# Patient Record
Sex: FEMALE | Race: NATIVE HAWAIIAN OR OTHER PACIFIC ISLANDER | HISPANIC OR LATINO | URBAN - METROPOLITAN AREA
[De-identification: names, ages, dates, MRNs, and addresses within clinical notes are randomized per-mention and may not be internally consistent; named-entity substitution may affect disease eponyms.]

---

## 2023-07-20 ENCOUNTER — EMERGENCY (EMERGENCY)
Facility: HOSPITAL | Age: 23
LOS: 1 days | Discharge: HOME | End: 2023-07-20
Attending: EMERGENCY MEDICINE | Admitting: EMERGENCY MEDICINE
Payer: MEDICAID

## 2023-07-20 VITALS
DIASTOLIC BLOOD PRESSURE: 73 MMHG | TEMPERATURE: 98 F | SYSTOLIC BLOOD PRESSURE: 110 MMHG | OXYGEN SATURATION: 98 % | RESPIRATION RATE: 17 BRPM | HEART RATE: 76 BPM | WEIGHT: 110.01 LBS

## 2023-07-20 PROCEDURE — 99283 EMERGENCY DEPT VISIT LOW MDM: CPT

## 2023-07-20 RX ORDER — CIPROFLOXACIN AND DEXAMETHASONE 3; 1 MG/ML; MG/ML
4 SUSPENSION/ DROPS AURICULAR (OTIC)
Qty: 7.5 | Refills: 0
Start: 2023-07-20 | End: 2023-07-26

## 2023-07-20 NOTE — ED PROVIDER NOTE - NSFOLLOWUPINSTRUCTIONS_ED_ALL_ED_FT
Otitis externa  Otitis Externa  Ear anatomy showing the outer ear canal and the eardrum. The outer ear canal is red due to swimmer's ear.  La otitis externa es clifton infección del canal auditivo externo. El canal auditivo externo es la vivienne que está entre el exterior de la oreja y el tímpano. A la otitis externa también se la llama oído de nadador.    ¿Cuáles son las causas?  Las causas más frecuentes de esta afección incluyen las siguientes:  Nadar en agua sucia.  Humedad en el oído.  Clifton lesión en el interior del oído.  Un objeto atascado en el oído.  Un rogerio o rasguño en la parte exterior del oído o en el canal auditivo.  ¿Qué incrementa el riesgo?  Es más probable que presente esta afección si nada con frecuencia.    ¿Cuáles son los signos o síntomas?  En general, el primer síntoma de esta afección es la picazón en el canal auditivo. Los síntomas posteriores de la afección incluyen los siguientes:  Hinchazón del oído.  Enrojecimiento del oído.  Dolor de oído. El dolor puede empeorar cuando se cruz de la oreja.  Secreción de pus del oído.  ¿Cómo se diagnostica?  Esta afección puede diagnosticarse con un examen del oído y un análisis del líquido que sale del oído para detectar si tiene bacterias y hongos.    ¿Cómo se trata?  El tratamiento de esta afección puede incluir:  Gotas óticas con antibiótico. Generalmente se aplican jamal 10 a 14 días.  Medicamentos para reducir la picazón y la hinchazón.  Siga estas instrucciones en guardado casa:  Si le recetaron gotas óticas con antibiótico, úselas good se lo haya indicado el médico. No deje de usar el antibiótico aunque comience a sentirse mejor.  Use los medicamentos de venta kemar y los recetados solamente good se lo haya indicado el médico.  Evite que le entre agua en los oídos, good se lo haya indicado el médico. Dewey puede incluir no nadar ni practicar deportes de agua jamal algunos días.  Concurra a todas las visitas de seguimiento. Dewey es importante.  ¿Cómo se blair?  Mantenga secos los oídos. Use la punta de clifton toalla para secarse los oídos después de nadar o de darse un baño.  Evite rascarse o poner objetos en el oído. Estas acciones pueden dañar el canal auditivo o remover el recubrimiento de cera que lo protege y así facilitar el crecimiento de bacterias y hongos.  Evite nadar en bernabe, en agua contaminada o en piscinas que pueden no tener el cloro suficiente.  Comuníquese con un médico si:  Tiene fiebre.  Guardado oído continúa crouch, hinchado, le duele o supura pus después de 3 días.  El dolor, la hinchazón o el enrojecimiento empeoran.  Siente un dolor de madelyn intenso.  Solicite ayuda de inmediato si:  Tiene en la vivienne detrás de la oreja neelima, hinchada, le duele o está sensible.  Resumen  La otitis externa es clifton infección del canal auditivo externo.  Las causas frecuentes son nadar en agua sucia, que quede humedad en el oído o tener un rogerio o un rasguño en el oído.  Los síntomas son dolor, enrojecimiento e hinchazón del canal auditivo.  Si le recetaron gotas óticas con antibiótico, úselas good se lo haya indicado el médico. No deje de usar el antibiótico aunque comience a sentirse mejor.  Esta información no tiene good fin reemplazar el consejo del médico. Asegúrese de hacerle al médico cualquier pregunta que tenga.    Document Revised: 03/22/2022 Document Reviewed: 03/22/2022

## 2023-07-20 NOTE — ED ADULT NURSE NOTE - NSFALLUNIVINTERV_ED_ALL_ED
Bed/Stretcher in lowest position, wheels locked, appropriate side rails in place/Call bell, personal items and telephone in reach/Instruct patient to call for assistance before getting out of bed/chair/stretcher/Non-slip footwear applied when patient is off stretcher/[place] to call system/Physically safe environment - no spills, clutter or unnecessary equipment/Purposeful proactive rounding/Room/bathroom lighting operational, light cord in reach

## 2023-07-20 NOTE — ED PROVIDER NOTE - PATIENT PORTAL LINK FT
You can access the FollowMyHealth Patient Portal offered by [facility] by registering at the following website: [email or web address]. By joining [facility] [facility]’s FollowMyHealth portal, you will also be able to view your health information using other applications (apps) compatible with our system.

## 2023-07-22 DIAGNOSIS — H60.91 UNSPECIFIED OTITIS EXTERNA, RIGHT EAR: ICD-10-CM

## 2023-07-22 DIAGNOSIS — H92.01 OTALGIA, RIGHT EAR: ICD-10-CM
